# Patient Record
Sex: MALE | Race: OTHER | NOT HISPANIC OR LATINO
[De-identification: names, ages, dates, MRNs, and addresses within clinical notes are randomized per-mention and may not be internally consistent; named-entity substitution may affect disease eponyms.]

---

## 2024-02-01 PROBLEM — Z00.00 ENCOUNTER FOR PREVENTIVE HEALTH EXAMINATION: Status: ACTIVE | Noted: 2024-02-01

## 2024-02-08 ENCOUNTER — NON-APPOINTMENT (OUTPATIENT)
Age: 54
End: 2024-02-08

## 2024-02-08 DIAGNOSIS — R13.10 DYSPHAGIA, UNSPECIFIED: ICD-10-CM

## 2024-02-09 ENCOUNTER — APPOINTMENT (OUTPATIENT)
Dept: CARDIOLOGY | Facility: CLINIC | Age: 54
End: 2024-02-09
Payer: COMMERCIAL

## 2024-02-09 ENCOUNTER — NON-APPOINTMENT (OUTPATIENT)
Age: 54
End: 2024-02-09

## 2024-02-09 VITALS
SYSTOLIC BLOOD PRESSURE: 122 MMHG | HEIGHT: 66 IN | HEART RATE: 80 BPM | OXYGEN SATURATION: 98 % | RESPIRATION RATE: 18 BRPM | WEIGHT: 143.13 LBS | TEMPERATURE: 97.6 F | DIASTOLIC BLOOD PRESSURE: 80 MMHG | BODY MASS INDEX: 23 KG/M2

## 2024-02-09 DIAGNOSIS — R00.2 PALPITATIONS: ICD-10-CM

## 2024-02-09 DIAGNOSIS — R07.9 CHEST PAIN, UNSPECIFIED: ICD-10-CM

## 2024-02-09 DIAGNOSIS — R06.02 SHORTNESS OF BREATH: ICD-10-CM

## 2024-02-09 DIAGNOSIS — Z87.442 PERSONAL HISTORY OF URINARY CALCULI: ICD-10-CM

## 2024-02-09 DIAGNOSIS — Z82.49 FAMILY HISTORY OF ISCHEMIC HEART DISEASE AND OTHER DISEASES OF THE CIRCULATORY SYSTEM: ICD-10-CM

## 2024-02-09 DIAGNOSIS — Z78.9 OTHER SPECIFIED HEALTH STATUS: ICD-10-CM

## 2024-02-09 DIAGNOSIS — D35.00 BENIGN NEOPLASM OF UNSPECIFIED ADRENAL GLAND: ICD-10-CM

## 2024-02-09 DIAGNOSIS — I10 ESSENTIAL (PRIMARY) HYPERTENSION: ICD-10-CM

## 2024-02-09 PROCEDURE — 99205 OFFICE O/P NEW HI 60 MIN: CPT | Mod: 25

## 2024-02-09 PROCEDURE — 93000 ELECTROCARDIOGRAM COMPLETE: CPT

## 2024-02-09 PROCEDURE — 36415 COLL VENOUS BLD VENIPUNCTURE: CPT

## 2024-02-09 NOTE — CARDIOLOGY SUMMARY
[de-identified] : 2/9/24-nsr [de-identified] : nuclear 2018-probably normal, anterior fixed, gut attenuation , left shoulder pain, normal ekg [de-identified] : 2/2021-normal lv function, trace mr/tr [de-identified] : CTA 2018 no cad, incidental adrenal adenoma

## 2024-02-09 NOTE — HISTORY OF PRESENT ILLNESS
[FreeTextEntry1] : Mr Gonzales is a new patient with HTN.  He has previously Been followed by cardiologist in [place] Dr. Nick Nieto. He gets occasional mid and left chest sharp pains when he is under stress, nonexeretional, lasts hours-day and is resolved spontaneously, accompanied by shortness of breath. He gets dull left scapula discomfort. He has occasional palpitations.  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  He recognizes that many of his symptoms may be related to stress. He does not do formal exercise.

## 2024-02-10 LAB
ALBUMIN SERPL ELPH-MCNC: 4.7 G/DL
ALP BLD-CCNC: 76 U/L
ALT SERPL-CCNC: 27 U/L
ANION GAP SERPL CALC-SCNC: 15 MMOL/L
AST SERPL-CCNC: 22 U/L
BILIRUB SERPL-MCNC: 0.4 MG/DL
BUN SERPL-MCNC: 17 MG/DL
CALCIUM SERPL-MCNC: 10 MG/DL
CHLORIDE SERPL-SCNC: 107 MMOL/L
CHOLEST SERPL-MCNC: 194 MG/DL
CO2 SERPL-SCNC: 21 MMOL/L
CREAT SERPL-MCNC: 1 MG/DL
CRP SERPL HS-MCNC: 0.81 MG/L
EGFR: 91 ML/MIN/1.73M2
GLUCOSE SERPL-MCNC: 102 MG/DL
HCT VFR BLD CALC: 44.6 %
HDLC SERPL-MCNC: 50 MG/DL
HGB BLD-MCNC: 14.6 G/DL
LDLC SERPL CALC-MCNC: 124 MG/DL
MCHC RBC-ENTMCNC: 30.5 PG
MCHC RBC-ENTMCNC: 32.7 GM/DL
MCV RBC AUTO: 93.3 FL
NONHDLC SERPL-MCNC: 144 MG/DL
PLATELET # BLD AUTO: 274 K/UL
POTASSIUM SERPL-SCNC: 4.1 MMOL/L
PROT SERPL-MCNC: 7.3 G/DL
RBC # BLD: 4.78 M/UL
RBC # FLD: 12.9 %
SODIUM SERPL-SCNC: 143 MMOL/L
TRIGL SERPL-MCNC: 116 MG/DL
TSH SERPL-ACNC: 0.89 UIU/ML
WBC # FLD AUTO: 5.93 K/UL

## 2024-02-12 LAB — APO LP(A) SERPL-MCNC: 11.6 NMOL/L

## 2024-02-29 ENCOUNTER — APPOINTMENT (OUTPATIENT)
Dept: PULMONOLOGY | Facility: CLINIC | Age: 54
End: 2024-02-29
Payer: COMMERCIAL

## 2024-02-29 VITALS
HEIGHT: 66 IN | OXYGEN SATURATION: 98 % | BODY MASS INDEX: 23.3 KG/M2 | HEART RATE: 102 BPM | SYSTOLIC BLOOD PRESSURE: 110 MMHG | DIASTOLIC BLOOD PRESSURE: 72 MMHG | WEIGHT: 145 LBS

## 2024-02-29 DIAGNOSIS — G47.00 INSOMNIA, UNSPECIFIED: ICD-10-CM

## 2024-02-29 PROCEDURE — 99204 OFFICE O/P NEW MOD 45 MIN: CPT

## 2024-02-29 RX ORDER — ESCITALOPRAM OXALATE 10 MG/1
10 TABLET, FILM COATED ORAL
Refills: 0 | Status: ACTIVE | COMMUNITY

## 2024-02-29 NOTE — HISTORY OF PRESENT ILLNESS
[FreeTextEntry1] : 54 yo M w/ HTN referred by cardiology for MALA. Patient is known to snore, which brought up this concern. He has both sleep maintenance and initiation insomnia but also has had a PSG in 2010 or so, which showed mild sleep apnea (per patient, AHI was around 6). He underwent a CPAP titration afterwards but was unable to tolerate CPAP and has not followed up with anyone since. He has not significantly gained weight since then and snoring has been an issue well before the PSG.  Other than snoring, he has daytime sleepiness and fatigue. He is unclear whether he has apneas but believes no. No abnormal movements or any other sleep related symptoms.  [ESS] : 8

## 2024-02-29 NOTE — PHYSICAL EXAM
[General Appearance - Well Developed] : well developed [Normal Appearance] : normal appearance [Well Groomed] : well groomed [General Appearance - Well Nourished] : well nourished [No Deformities] : no deformities [General Appearance - In No Acute Distress] : no acute distress [Low Lying Soft Palate] : low lying soft palate [Elongated Uvula] : elongated uvula [Erythema] : erythema of the pharynx [II] : II [Neck Appearance] : the appearance of the neck was normal [Thyroid Diffuse Enlargement] : the thyroid was not enlarged [Jugular Venous Distention Increased] : there was no jugular-venous distention [Neck Cervical Mass (___cm)] : no neck mass was observed [Thyroid Nodule] : there were no palpable thyroid nodules [Heart Rate And Rhythm] : heart rate was normal and rhythm regular [Heart Sounds] : normal S1 and S2 [Heart Sounds Gallop] : no gallops [Murmurs] : no murmurs [Heart Sounds Pericardial Friction Rub] : no pericardial rub [Auscultation Breath Sounds / Voice Sounds] : lungs were clear to auscultation bilaterally [Cyanosis, Localized] : no localized cyanosis [Nail Clubbing] : no clubbing of the fingernails [Petechial Hemorrhages (___cm)] : no petechial hemorrhages [Skin Turgor] : normal skin turgor [Skin Color & Pigmentation] : normal skin color and pigmentation [] : no rash [Deep Tendon Reflexes (DTR)] : deep tendon reflexes were 2+ and symmetric [Sensation] : the sensory exam was normal to light touch and pinprick [No Focal Deficits] : no focal deficits [Oriented To Time, Place, And Person] : oriented to person, place, and time [Impaired Insight] : insight and judgment were intact [Affect] : the affect was normal

## 2024-02-29 NOTE — ASSESSMENT
[FreeTextEntry1] : 52 yo M w/ HTN referred by cardiology for MALA.   > MALA > Insomnia  - Given mild symptoms with not much daytime sleepiness and only snoring, pretest probability is low - will do PSG, which patient is agreeable to - With mild degree of sleep apnea, unclear whether his insomnia is related to severe MALA. He has been put on diazepam, alprazolam, and occasional escitalopram as a sleep aid. He also uses OTC medications but this makes him groggy so he tries not to use it. I have asked him to discontinue everything as these are all not ideal sleep medications. He does have chronic insomnia that stems from poor sleep hygiene and lack of stimulus control. We have provided counseling the latter by establishing a regular morning rise time to help strengthen the circadian clock regulating sleep and wakefulness. On average, this patient goes to sleep at 2AM  which we suggested be his new bedtime. If patient is unable to fall asleep either at the beginning or in the middle of the night for >30min, patient is to get out of bed and return to bed only when sleepy again. Patient showed understanding and will attempt to adhere to these measures. Will also prescribe lunesta 3mg as a trial though  f/u in 1 month.

## 2024-03-22 ENCOUNTER — APPOINTMENT (OUTPATIENT)
Dept: PULMONOLOGY | Facility: CLINIC | Age: 54
End: 2024-03-22
Payer: COMMERCIAL

## 2024-03-22 VITALS
OXYGEN SATURATION: 97 % | BODY MASS INDEX: 23.14 KG/M2 | DIASTOLIC BLOOD PRESSURE: 68 MMHG | HEIGHT: 66 IN | SYSTOLIC BLOOD PRESSURE: 118 MMHG | HEART RATE: 72 BPM | WEIGHT: 144 LBS

## 2024-03-22 DIAGNOSIS — F51.04 PSYCHOPHYSIOLOGIC INSOMNIA: ICD-10-CM

## 2024-03-22 DIAGNOSIS — G47.33 OBSTRUCTIVE SLEEP APNEA (ADULT) (PEDIATRIC): ICD-10-CM

## 2024-03-22 PROCEDURE — G2211 COMPLEX E/M VISIT ADD ON: CPT

## 2024-03-22 PROCEDURE — 99213 OFFICE O/P EST LOW 20 MIN: CPT

## 2024-03-22 RX ORDER — ESZOPICLONE 3 MG/1
3 TABLET, FILM COATED ORAL
Qty: 30 | Refills: 0 | Status: DISCONTINUED | COMMUNITY
Start: 2024-02-29 | End: 2024-03-22

## 2024-03-22 RX ORDER — ZOLPIDEM TARTRATE 6.25 MG/1
6.25 TABLET, EXTENDED RELEASE ORAL
Qty: 60 | Refills: 0 | Status: ACTIVE | COMMUNITY
Start: 2024-03-22 | End: 1900-01-01

## 2024-03-22 NOTE — HISTORY OF PRESENT ILLNESS
[FreeTextEntry1] : 54 yo M w/ HTN referred by cardiology for MALA. Patient is known to snore, which brought up this concern. He has both sleep maintenance and initiation insomnia but also has had a PSG in 2010 or so, which showed mild sleep apnea (per patient, AHI was around 6). He underwent a CPAP titration afterwards but was unable to tolerate CPAP and has not followed up with anyone since. He has not significantly gained weight since then and snoring has been an issue well before the PSG.  Other than snoring, he has daytime sleepiness and fatigue. He is unclear whether he has apneas but believes no. No abnormal movements or any other sleep related symptoms.   3/22/24 Patient presents for follow up. Symptoms unchanged but discontinued lunesta due to development of metallic taste in his mouth. Unclear if it benefited him for sleep. Still with chest tightness at rest but no dyspnea  [ESS] : 12

## 2024-03-22 NOTE — ASSESSMENT
[FreeTextEntry1] : 52 yo M w/ HTN referred by cardiology for MALA.   > MALA > Insomnia  - Given mild symptoms with not much daytime sleepiness and only snoring, pretest probability is low so plan was to do PSG. Patient was unable to do it yet and will call sleep lab to hasten test. - With mild degree of sleep apnea, unclear whether his insomnia is related to severe MALA. He has been put on diazepam, alprazolam, and occasional escitalopram as a sleep aid. He also uses OTC medications but this makes him groggy so he tries not to use it. I have asked him to discontinue everything again as he mentioned he still took escitalopram occasionally - which likely has no good effect on psych symptoms but may worsen his sleep. Will triy ambien CR as he responded well on Ambien IR, but still had issues with sleep maintenance.  - He does have chronic insomnia that stems from poor sleep hygiene and lack of stimulus control. We have provided counseling the latter by establishing a regular morning rise time to help strengthen the circadian clock regulating sleep and wakefulness. On average, this patient goes to sleep at 2AM  which we suggested be his new bedtime. If patient is unable to fall asleep either at the beginning or in the middle of the night for >30min, patient is to get out of bed and return to bed only when sleepy again. Patient showed understanding and will attempt to adhere to these measures. Proceed with ambien CR for now - PFT for chest tightness. Physical exam benign but will obtain to ensure there is no pulmonary pathology   f/u in 1.5 month.

## 2024-03-22 NOTE — PHYSICAL EXAM
[General Appearance - Well Developed] : well developed [Normal Appearance] : normal appearance [Well Groomed] : well groomed [General Appearance - Well Nourished] : well nourished [No Deformities] : no deformities [General Appearance - In No Acute Distress] : no acute distress [Low Lying Soft Palate] : low lying soft palate [Elongated Uvula] : elongated uvula [Erythema] : erythema of the pharynx [II] : II [Neck Appearance] : the appearance of the neck was normal [Neck Cervical Mass (___cm)] : no neck mass was observed [Jugular Venous Distention Increased] : there was no jugular-venous distention [Thyroid Diffuse Enlargement] : the thyroid was not enlarged [Thyroid Nodule] : there were no palpable thyroid nodules [Heart Rate And Rhythm] : heart rate was normal and rhythm regular [Heart Sounds] : normal S1 and S2 [Heart Sounds Gallop] : no gallops [Murmurs] : no murmurs [Heart Sounds Pericardial Friction Rub] : no pericardial rub [Auscultation Breath Sounds / Voice Sounds] : lungs were clear to auscultation bilaterally [Nail Clubbing] : no clubbing of the fingernails [Petechial Hemorrhages (___cm)] : no petechial hemorrhages [Cyanosis, Localized] : no localized cyanosis [Skin Color & Pigmentation] : normal skin color and pigmentation [] : no rash [Skin Turgor] : normal skin turgor [Sensation] : the sensory exam was normal to light touch and pinprick [Deep Tendon Reflexes (DTR)] : deep tendon reflexes were 2+ and symmetric [No Focal Deficits] : no focal deficits [Oriented To Time, Place, And Person] : oriented to person, place, and time [Impaired Insight] : insight and judgment were intact [Affect] : the affect was normal

## 2024-04-10 ENCOUNTER — APPOINTMENT (OUTPATIENT)
Dept: CARDIOLOGY | Facility: CLINIC | Age: 54
End: 2024-04-10
Payer: COMMERCIAL

## 2024-04-10 PROCEDURE — 93306 TTE W/DOPPLER COMPLETE: CPT

## 2024-06-13 RX ORDER — OLMESARTAN MEDOXOMIL 20 MG/1
20 TABLET, FILM COATED ORAL DAILY
Qty: 90 | Refills: 3 | Status: ACTIVE | COMMUNITY
Start: 2024-06-13 | End: 1900-01-01

## 2024-06-13 RX ORDER — OLMESARTAN MEDOXOMIL AND HYDROCHLOROTHIAZIDE 20; 12.5 MG/1; MG/1
20-12.5 TABLET ORAL DAILY
Qty: 90 | Refills: 3 | Status: DISCONTINUED | COMMUNITY
Start: 1900-01-01 | End: 2024-06-13

## 2025-01-04 ENCOUNTER — NON-APPOINTMENT (OUTPATIENT)
Age: 55
End: 2025-01-04

## 2025-01-04 DIAGNOSIS — G47.00 INSOMNIA, UNSPECIFIED: ICD-10-CM

## 2025-01-04 DIAGNOSIS — F51.04 PSYCHOPHYSIOLOGIC INSOMNIA: ICD-10-CM

## 2025-01-04 DIAGNOSIS — R13.10 DYSPHAGIA, UNSPECIFIED: ICD-10-CM

## 2025-01-06 ENCOUNTER — APPOINTMENT (OUTPATIENT)
Dept: CARDIOLOGY | Facility: CLINIC | Age: 55
End: 2025-01-06

## 2025-03-17 ENCOUNTER — NON-APPOINTMENT (OUTPATIENT)
Age: 55
End: 2025-03-17

## 2025-03-17 ENCOUNTER — APPOINTMENT (OUTPATIENT)
Facility: CLINIC | Age: 55
End: 2025-03-17
Payer: COMMERCIAL

## 2025-03-17 PROCEDURE — 92002 INTRM OPH EXAM NEW PATIENT: CPT

## 2025-03-17 PROCEDURE — 92133 CPTRZD OPH DX IMG PST SGM ON: CPT

## 2025-03-17 PROCEDURE — 92020 GONIOSCOPY: CPT

## 2025-03-17 PROCEDURE — 92083 EXTENDED VISUAL FIELD XM: CPT

## 2025-03-17 PROCEDURE — 76514 ECHO EXAM OF EYE THICKNESS: CPT

## 2025-03-17 PROCEDURE — 76513 OPH US DX ANT SGM US UNI/BI: CPT | Mod: RT

## 2025-05-05 ENCOUNTER — APPOINTMENT (OUTPATIENT)
Facility: CLINIC | Age: 55
End: 2025-05-05

## 2025-05-19 ENCOUNTER — APPOINTMENT (OUTPATIENT)
Facility: CLINIC | Age: 55
End: 2025-05-19

## 2025-06-06 ENCOUNTER — APPOINTMENT (OUTPATIENT)
Dept: DERMATOLOGY | Facility: CLINIC | Age: 55
End: 2025-06-06
Payer: COMMERCIAL

## 2025-06-06 VITALS — WEIGHT: 145 LBS | BODY MASS INDEX: 23.3 KG/M2 | HEIGHT: 66 IN

## 2025-06-06 PROCEDURE — 99203 OFFICE O/P NEW LOW 30 MIN: CPT

## 2025-07-08 ENCOUNTER — NON-APPOINTMENT (OUTPATIENT)
Age: 55
End: 2025-07-08

## 2025-07-10 ENCOUNTER — APPOINTMENT (OUTPATIENT)
Dept: CARDIOLOGY | Facility: CLINIC | Age: 55
End: 2025-07-10
Payer: COMMERCIAL

## 2025-07-10 VITALS
BODY MASS INDEX: 23.14 KG/M2 | SYSTOLIC BLOOD PRESSURE: 101 MMHG | DIASTOLIC BLOOD PRESSURE: 80 MMHG | WEIGHT: 144 LBS | HEIGHT: 66 IN | OXYGEN SATURATION: 99 % | HEART RATE: 64 BPM

## 2025-07-10 PROBLEM — Z87.898 HISTORY OF DYSPHAGIA: Status: RESOLVED | Noted: 2024-02-08 | Resolved: 2025-07-10

## 2025-07-10 PROBLEM — R73.09 ELEVATED GLUCOSE: Status: ACTIVE | Noted: 2025-07-10

## 2025-07-10 PROBLEM — R06.02 SHORTNESS OF BREATH AT REST: Status: RESOLVED | Noted: 2024-02-09 | Resolved: 2025-07-10

## 2025-07-10 PROBLEM — I65.22 ATHEROSCLEROSIS OF LEFT CAROTID ARTERY: Status: ACTIVE | Noted: 2025-07-10

## 2025-07-10 PROBLEM — E78.00 ELEVATED LDL CHOLESTEROL LEVEL: Status: ACTIVE | Noted: 2025-07-10

## 2025-07-10 PROBLEM — N41.9 PROSTATITIS: Status: ACTIVE | Noted: 2025-07-10

## 2025-07-10 PROBLEM — M10.472: Status: RESOLVED | Noted: 2025-07-10 | Resolved: 2025-07-10

## 2025-07-10 PROBLEM — Z87.898 HISTORY OF CHEST PAIN AT REST: Status: RESOLVED | Noted: 2024-02-09 | Resolved: 2025-07-10

## 2025-07-10 PROBLEM — Z87.898 HISTORY OF PALPITATIONS: Status: RESOLVED | Noted: 2024-02-09 | Resolved: 2025-07-10

## 2025-07-10 PROCEDURE — 93000 ELECTROCARDIOGRAM COMPLETE: CPT

## 2025-07-10 PROCEDURE — 99215 OFFICE O/P EST HI 40 MIN: CPT | Mod: 25

## 2025-07-10 RX ORDER — ALLOPURINOL 200 MG/1
TABLET ORAL
Refills: 0 | Status: ACTIVE | COMMUNITY

## 2025-07-10 RX ORDER — ROSUVASTATIN CALCIUM 10 MG/1
10 TABLET, FILM COATED ORAL DAILY
Qty: 90 | Refills: 3 | Status: ACTIVE | COMMUNITY
Start: 2025-07-10 | End: 1900-01-01

## 2025-08-12 ENCOUNTER — LABORATORY RESULT (OUTPATIENT)
Age: 55
End: 2025-08-12